# Patient Record
Sex: MALE | Race: WHITE | Employment: FULL TIME | URBAN - METROPOLITAN AREA
[De-identification: names, ages, dates, MRNs, and addresses within clinical notes are randomized per-mention and may not be internally consistent; named-entity substitution may affect disease eponyms.]

---

## 2017-06-11 ENCOUNTER — OFFICE VISIT (OUTPATIENT)
Dept: FAMILY MEDICINE CLINIC | Facility: CLINIC | Age: 52
End: 2017-06-11

## 2017-06-11 VITALS — TEMPERATURE: 98 F | SYSTOLIC BLOOD PRESSURE: 130 MMHG | WEIGHT: 222 LBS | DIASTOLIC BLOOD PRESSURE: 82 MMHG

## 2017-06-11 DIAGNOSIS — L73.8 HOT TUB FOLLICULITIS: Primary | ICD-10-CM

## 2017-06-11 DIAGNOSIS — L73.9 FOLLICULITIS: ICD-10-CM

## 2017-06-11 PROCEDURE — 99213 OFFICE O/P EST LOW 20 MIN: CPT | Performed by: PHYSICIAN ASSISTANT

## 2017-06-11 RX ORDER — CIPROFLOXACIN 500 MG/1
500 TABLET, FILM COATED ORAL 2 TIMES DAILY
Qty: 14 TABLET | Refills: 0 | Status: SHIPPED | OUTPATIENT
Start: 2017-06-11 | End: 2017-06-18

## 2017-06-11 NOTE — PATIENT INSTRUCTIONS
1.  Cipro 500 mg twice daily as prescribed. Folliculitis  Folliculitis is an infection of a hair follicle. A hair follicle is the little pocket where a hair grows out of the skin. Bacteria normally live on the skin.  But sometimes bacteria can get t · Change sheets and blankets if they are soiled by pus. Wash all clothes, towels, sheets, and cloth diapers in soap and hot water. Do not share clothes, towels, or sheets with other family members. · Do not soak the sores in bath water.  This can spread in

## 2017-06-11 NOTE — PROGRESS NOTES
CHIEF COMPLAINT:   Patient presents with:  Rash: x days, asymptomatic. HPI:   Nataly Urias is a 54year old male who presents for evaluation of a rash. Per patient rash started in the past 3 days. Mildly pruritic, o/w asymptomatic.   Reports EYES: PERRLA, EOMI, conjunctiva are clear      ASSESSMENT AND PLAN:   Renard Mitchell is a 54year old male who presents for evaluation of a rash.  Findings are consistent with:    ASSESSMENT:  Hot tub folliculitis  (primary encounter diagnosis)  Folliculiti Sores often go away in a few days with no treatment. In some cases, medication may be given. A small piece of skin or pus may be taken to find the type of bacteria causing the infection.   Home care  The health care provider may prescribe an antibiotic crea © 9733-1485 The [address] [place] [address], [address]. All rights reserved. This information is not intended as a substitute for professional medical care. Always follow your healthcare professional's instructions.             The

## 2020-05-21 ENCOUNTER — HOSPITAL ENCOUNTER (OUTPATIENT)
Dept: CT IMAGING | Age: 55
Discharge: HOME | End: 2020-05-21
Attending: INTERNAL MEDICINE

## 2020-05-21 DIAGNOSIS — Z13.6 ENCOUNTER FOR SCREENING FOR CARDIOVASCULAR DISORDERS: ICD-10-CM

## 2021-06-07 ENCOUNTER — OFFICE VISIT (OUTPATIENT)
Dept: FAMILY MEDICINE CLINIC | Facility: CLINIC | Age: 56
End: 2021-06-07
Payer: COMMERCIAL

## 2021-06-07 VITALS
HEART RATE: 88 BPM | OXYGEN SATURATION: 98 % | SYSTOLIC BLOOD PRESSURE: 142 MMHG | BODY MASS INDEX: 28.92 KG/M2 | WEIGHT: 213.5 LBS | HEIGHT: 72 IN | TEMPERATURE: 98 F | DIASTOLIC BLOOD PRESSURE: 96 MMHG | RESPIRATION RATE: 18 BRPM

## 2021-06-07 DIAGNOSIS — Z86.16 HISTORY OF COVID-19: ICD-10-CM

## 2021-06-07 DIAGNOSIS — R97.20 ELEVATED PSA: ICD-10-CM

## 2021-06-07 DIAGNOSIS — J12.82 PNEUMONIA DUE TO COVID-19 VIRUS: ICD-10-CM

## 2021-06-07 DIAGNOSIS — G93.3 POST VIRAL SYNDROME: Primary | ICD-10-CM

## 2021-06-07 DIAGNOSIS — U07.1 PNEUMONIA DUE TO COVID-19 VIRUS: ICD-10-CM

## 2021-06-07 DIAGNOSIS — R82.998 FROTHY URINE: ICD-10-CM

## 2021-06-07 DIAGNOSIS — R03.0 ELEVATED BLOOD PRESSURE READING: ICD-10-CM

## 2021-06-07 PROCEDURE — 3080F DIAST BP >= 90 MM HG: CPT | Performed by: FAMILY MEDICINE

## 2021-06-07 PROCEDURE — 99204 OFFICE O/P NEW MOD 45 MIN: CPT | Performed by: FAMILY MEDICINE

## 2021-06-07 PROCEDURE — 3008F BODY MASS INDEX DOCD: CPT | Performed by: FAMILY MEDICINE

## 2021-06-07 PROCEDURE — 3077F SYST BP >= 140 MM HG: CPT | Performed by: FAMILY MEDICINE

## 2021-06-07 RX ORDER — FINASTERIDE 5 MG/1
5 TABLET, FILM COATED ORAL DAILY
COMMUNITY
Start: 2021-02-10

## 2021-06-07 RX ORDER — DOXYCYCLINE HYCLATE 100 MG/1
100 CAPSULE ORAL 2 TIMES DAILY
COMMUNITY
Start: 2021-06-03

## 2021-06-07 NOTE — PROGRESS NOTES
New patient here to establish care. + for Covid on 5/24/21(tested at [facility]). Slight loss of taste and smell. Very fatigue/not able to sleep at night. Urinating often @ night. Very thirsty. Very anxious with this situation.   Patient was exposed to someo

## 2021-06-07 NOTE — PROGRESS NOTES
[facility] Family Medicine Office Note  Chief Complaint:   Patient presents with:  Er F/u      HPI:   This is a 50year old male with history of gout and hyperlipidemia, and ?  Hypertension and now coming in for follow up 1 week after ER visit to fatigue/not able to sleep at night because he is anxious about COVID.  Admits to not having had a PMD in the last few years and has had some blood work done through the [place] in the past.     Has been urinating often @ night, very thirsty on and off and notes cyanosis, warm and dry  Eyes: wnl, normal conjunctiva   HEAD: Normocephalic, atraumatic  EENT: OP - wnl, moist, Nares normal  NECK: FROM, supple  LUNGS: CTAB, no RRW, good respiratory effort and no cough spasms with taking deep breaths.    CV: RRR  ABD: not would have made him a candidate for MAB infusion    5. Elevated PSA  Sees urology at [facility] and has had some medication changes     6. Frothy urine  New finding that the patient has noticed  ?  Some concern about his kidneys and wants all his labs updated

## 2021-10-22 ENCOUNTER — PATIENT OUTREACH (OUTPATIENT)
Dept: FAMILY MEDICINE CLINIC | Facility: CLINIC | Age: 56
End: 2021-10-22

## 2024-03-20 ENCOUNTER — PATIENT OUTREACH (OUTPATIENT)
Dept: CASE MANAGEMENT | Age: 59
End: 2024-03-20

## 2024-03-20 NOTE — PROCEDURES
The office order for PCP removal request is Approved and finalized on March 20, 2024.    Thanks,  Critical access hospital Team

## (undated) NOTE — MR AVS SNAPSHOT
[address] [facility]  [place] [address]  [phone]               Thank you for choosing us for your health care visit with Marbella Carlson PA-C.   We are glad to serve you and happy to provide you with Oral antibiotics may also be prescribed. Or you may be told to use an over-the-counter antibiotic cream. Follow all instructions when using any of these medications.   General care:  · Apply warm, moist compresses to the sores for 20 minutes up to 3 times a Today's Vital Signs     BP Temp Weight             130/82 mmHg 97.9 °F (36.6 °C) (Oral) 222 lb            Current Medications          This list is accurate as of: 6/11/17  2:22 PM.  Always use your most recent med list.                azithromycin 250 MG EAT THESE FOODS MORE OFTEN: EAT THESE FOODS LESS OFTEN:   Make half your plate fruits and vegetables Highly refined, white starches including white bread, rice and pasta   Eat plenty of protein, keep the fat content low Sugars:  sodas and sports drinks, ca